# Patient Record
Sex: MALE | Race: WHITE | NOT HISPANIC OR LATINO | URBAN - METROPOLITAN AREA
[De-identification: names, ages, dates, MRNs, and addresses within clinical notes are randomized per-mention and may not be internally consistent; named-entity substitution may affect disease eponyms.]

---

## 2023-12-26 ENCOUNTER — OFFICE VISIT (OUTPATIENT)
Dept: URGENT CARE | Facility: URGENT CARE | Age: 23
End: 2023-12-26
Payer: COMMERCIAL

## 2023-12-26 VITALS
WEIGHT: 129 LBS | OXYGEN SATURATION: 100 % | TEMPERATURE: 99 F | HEART RATE: 91 BPM | DIASTOLIC BLOOD PRESSURE: 74 MMHG | SYSTOLIC BLOOD PRESSURE: 120 MMHG

## 2023-12-26 DIAGNOSIS — J02.9 SORE THROAT: Primary | ICD-10-CM

## 2023-12-26 LAB — DEPRECATED S PYO AG THROAT QL EIA: NEGATIVE

## 2023-12-26 PROCEDURE — 87651 STREP A DNA AMP PROBE: CPT | Performed by: PHYSICIAN ASSISTANT

## 2023-12-26 PROCEDURE — 99203 OFFICE O/P NEW LOW 30 MIN: CPT | Performed by: PHYSICIAN ASSISTANT

## 2023-12-26 NOTE — PROGRESS NOTES
Chief Complaint   Patient presents with    Pharyngitis     X 4 days, COVID test negative at home 2 days ago, taking Tylenol or Ibuprofen for pain        ASSESSMENT/PLAN:  Matthew was seen today for pharyngitis.    Diagnoses and all orders for this visit:    Sore throat  -     Streptococcus A Rapid Screen w/Reflex to PCR - Clinic Collect  -     Group A Streptococcus PCR Throat Swab    Rapid strep negative.  Reassuring exam and vitals.  Likely viral.  Symptomatic cares and expected length of symptoms discussed at length and outlined in AVS  Return precautions also discussed    Luis Manuel Ramachandran PA-C      SUBJECTIVE:  Matthew is a 23 year old male who presents to urgent care with 4 days of sore throat.  It worsened during the first 2 days and then has plateaued.  No other significant symptoms.  Took COVID test at home that was negative.    ROS: Pertinent ROS neg other than the symptoms noted above in the HPI.     OBJECTIVE:  /74 (BP Location: Left arm, Patient Position: Sitting, Cuff Size: Adult Regular)   Pulse 91   Temp 99  F (37.2  C) (Tympanic)   Wt 58.5 kg (129 lb)   SpO2 100%    GENERAL: healthy, alert and no distress  EYES: Eyes grossly normal to inspection, PERRL and conjunctivae and sclerae normal  HENT: ear canals and TM's normal, nose and mouth without ulcers or lesions, mild erythema of the oropharynx  NECK: no adenopathy, nontender   RESP: lungs clear to auscultation - no rales, rhonchi or wheezes  CV: regular rate and rhythm, normal S1 S2, no S3 or S4, no murmur, click or rub, no peripheral edema and peripheral pulses strong    DIAGNOSTICS    Results for orders placed or performed in visit on 12/26/23   Streptococcus A Rapid Screen w/Reflex to PCR - Clinic Collect     Status: Normal    Specimen: Throat; Swab   Result Value Ref Range    Group A Strep antigen Negative Negative        No current outpatient medications on file.     No current facility-administered medications for this visit.      There is  no problem list on file for this patient.     Past Medical History:   Diagnosis Date    Esophageal reflux     Unspecified fetal and  jaundice      No past surgical history on file.  No family history on file.  Social History     Tobacco Use    Smoking status: Never    Smokeless tobacco: Never   Substance Use Topics    Alcohol use: Not on file              The plan of care was discussed with the patient. They understand and agree with the course of treatment prescribed. A printed summary was given including instructions and medications.  The use of Dragon[employer] dictation services may have been used to construct the content in this note; any grammatical or spelling errors are non-intentional. Please contact the author of this note directly if you are in need of any clarification.

## 2023-12-27 LAB — GROUP A STREP BY PCR: NOT DETECTED
